# Patient Record
Sex: FEMALE | HISPANIC OR LATINO | RURAL
[De-identification: names, ages, dates, MRNs, and addresses within clinical notes are randomized per-mention and may not be internally consistent; named-entity substitution may affect disease eponyms.]

---

## 2018-09-11 ENCOUNTER — OFFICE VISIT (OUTPATIENT)
Dept: RETAIL CLINIC | Facility: CLINIC | Age: 38
End: 2018-09-11

## 2018-09-11 VITALS
BODY MASS INDEX: 30.62 KG/M2 | OXYGEN SATURATION: 98 % | DIASTOLIC BLOOD PRESSURE: 100 MMHG | HEART RATE: 87 BPM | RESPIRATION RATE: 12 BRPM | WEIGHT: 166.4 LBS | SYSTOLIC BLOOD PRESSURE: 160 MMHG | TEMPERATURE: 99.1 F | HEIGHT: 62 IN

## 2018-09-11 DIAGNOSIS — I10 ESSENTIAL HYPERTENSION: Primary | ICD-10-CM

## 2018-09-11 PROCEDURE — 99203 OFFICE O/P NEW LOW 30 MIN: CPT | Performed by: NURSE PRACTITIONER

## 2018-09-11 RX ORDER — LOSARTAN POTASSIUM 25 MG/1
25 TABLET ORAL DAILY
COMMUNITY
End: 2018-09-11 | Stop reason: SDUPTHER

## 2018-09-11 RX ORDER — LOSARTAN POTASSIUM 50 MG/1
50 TABLET ORAL DAILY
Qty: 90 TABLET | Refills: 0 | Status: SHIPPED | OUTPATIENT
Start: 2018-09-11 | End: 2018-10-20 | Stop reason: SDUPTHER

## 2018-09-11 NOTE — PATIENT INSTRUCTIONS
"Hipertensión  Hypertension  La hipertensión, conocida comúnmente [place] presión arterial florian, se produce cuando la jimy bombea en las arterias con mucha fuerza. Las arterias son los vasos sanguíneos que transportan la jimy desde el corazón al [place] del cuerpo. La hipertensión hace que el corazón melo más esfuerzo para bombear jimy y puede provocar que las arterias se estrechen o endurezcan. La hipertensión no tratada o no controlada puede causar infarto de miocardio, accidentes cerebrovasculares, enfermedad renal y otros problemas.  Elba lectura de la presión arterial consiste de un número más alto sobre un número más bajo. En condiciones ideales, la presión arterial debe estar por debajo de 120/80. El primer número (\"superior\") es la presión sistólica. Es la medida de la presión de las arterias cuando el corazón late. El max número (\"inferior\") es la presión diastólica. Es la medida de la presión en las arterias cuando el corazón se relaja.  ¿Cuáles son las causas?  Se desconoce la causa de esta afección.  ¿Qué incrementa el riesgo?  Algunos factores de riesgo de hipertensión están bajo moss control. Otros no.  Factores que puede modificar  · Fumar.  · Tener diabetes mellitus tipo 2, colesterol alto, o ambos.  · No hacer la cantidad suficiente de actividad física o ejercicio.  · Tener sobrepeso.  · Consumir mucha grasa, azúcar, calorías o sal (sodio) en moss dieta.  · Beber alcohol en exceso.  Factores que son difíciles o imposibles de modificar  · Tener enfermedad renal crónica.  · Tener antecedentes familiares de presión arterial florian.  · La edad. Los riesgos aumentan con la edad.  · La meka. El riesgo es mayor para las personas afroamericanas.  · El sexo. Antes de los 45 años, los hombres corren más riesgo que las mujeres. Después de los 65 años, las mujeres corren más riesgo que los hombres.  · Tener apnea obstructiva del sueño.  · El estrés.  ¿Cuáles son los signos o los síntomas?  La presión arterial " "extremadamente florian (crisis hipertensiva) puede provocar:  · Dolor de wm.  · Ansiedad.  · Falta de aire.  · Hemorragia nasal.  · Náuseas y vómitos.  · Dolor de pecho intenso.  · Maria Del Rosario crisis de movimientos que no puede controlar (convulsiones).    ¿Cómo se diagnostica?  Esta afección se diagnostica midiendo moss presión arterial mientras se encuentra sentado, con el brazo apoyado sobre maria del rosario superficie. El brazalete del tensiómetro debe colocarse directamente sobre la piel de la parte superior del brazo y al nivel de moss corazón. Debe medirla al menos dos veces en el mismo brazo. Determinadas condiciones pueden causar maria del rosario diferencia de presión arterial entre el brazo adelso y el derecho.  Ciertos factores pueden provocar que las lecturas de la presión arterial cristi inferiores o superiores a lo normal (elevadas) por un período corto de tiempo:  · Si moss presión arterial es más florian cuando se encuentra en el consultorio del médico que cuando la mide en moss hogar, se denomina \"hipertensión de bata yuriy\". La mayoría de las personas que tienen esta afección no deben ser medicadas.  · Si moss presión arterial es más florian en el hogar que cuando se encuentra en el consultorio del médico, se denomina \"hipertensión enmascarada\". La mayoría de las personas que tienen esta afección deben ser medicadas para controlar la presión arterial.    Si tiene maria del rosario lecturas de presión arterial florian mechelle maria del rosario visita o si tiene presión arterial normal con otros factores de riesgo:  · Es posible que se le pida que regrese otro día para volver a controlar moss presión arterial.  · Se le puede pedir que se controle la presión arterial en moss casa mechelle 1 semana o más.    Si se le diagnostica hipertensión, es posible que se le realicen otros análisis de jimy o estudios de diagnóstico por imágenes para ayudar a moss médico a comprender moss riesgo general de tener otras afecciones.  ¿Cómo se trata?  Esta afección se trata haciendo cambios saludables " en el estilo de rebecca, tales rakesh ingerir alimentos saludables, realizar más ejercicio y reducir el consumo de alcohol. El médico puede recetarle medicamentos si los cambios en el estilo de rebecca no son suficientes para lograr controlar la presión arterial y si:  · Moss presión arterial sistólica está por encima de 130.  · Moss presión arterial diastólica está por encima de 80.    La presión arterial deseada puede variar en función de las enfermedades, la edad y otros factores personales.  Siga estas instrucciones en moss casa:  Comida y bebida  · Siga maria del rosario dieta con alto contenido de fibras y potasio, y con bajo contenido de sodio, azúcar agregada y grasas. Un ejemplo de plan alimenticio es la dieta DASH (Dietary Approaches to Stop Hypertension, Métodos alimenticios para detener la hipertensión). Para alimentarse de esta manera:  ? Coma mucha fruta y verdura fresca. Trate de que la mitad del plato de cada comida sea de frutas y verduras.  ? Coma cereales integrales, rakehs pasta integral, arroz integral y pan integral. Llene aproximadamente un cuarto del plato con cereales integrales.  ? Coma y vaughn productos lácteos con bajo contenido de grasa, rakesh leche descremada o yogur bajo en grasas.  ? Evite la ingesta de bray de carne grasa, carne procesada o curada, y carne de ave con piel. Llene aproximadamente un cuarto del plato con proteínas magras, rakesh pescado, juvencio sin piel, frijoles, huevos y tofu.  ? Evite ingerir alimentos prehechos o procesados. En general, estos tienen mayor cantidad de sodio, azúcar agregada y grasa.  · Reduzca moss ingesta diaria de sodio. La mayoría de las personas que tienen hipertensión deben comer menos de 1500 mg de sodio por día.  · Limite el consumo de alcohol a no más de 1 medida por día si es isabell y no está embarazada y a 2 medidas por día si es hombre. Maria Del Rosario medida equivale a 12 onzas de cerveza, 5 onzas de vino o 1½ onzas de bebidas alcohólicas de florian graduación.  Estilo de rebecca  · Trabaje  con moss médico para mantener un peso saludable o perder peso. Pregúntele cual es moss peso recomendado.  · Realice al menos 30 minutos de ejercicio que melo que se acelere moss corazón (ejercicio aeróbico) la mayoría de los días de la semana. Estas actividades pueden incluir caminar, nadar o andar en bicicleta.  · Incluya ejercicios para [place] diana músculos (ejercicios de resistencia), rakesh pilates o levantamiento de pesas, rakesh parte de moss rutina semanal de ejercicios. Intente realizar 30 minutos de trang tipo de ejercicios al menos boni días a la semana.  · No consuma ningún producto que contenga nicotina o tabaco, ra[place]h cigarrillos y cigarrillos electrónicos. Si necesita ayuda para dejar de fumar, consulte al médico.  · Contrólese la presión arterial en moss casa según las indicaciones del médico.  · Concurra a todas las visitas de control rakesh se lo haya indicado el médico. [place] es importante.  Medicamentos  · [place] los medicamentos de venta yobany y los recetados solamente rakesh se lo haya indicado el médico. Siga cuidadosamente las indicaciones. Los medicamentos para la presión arterial deben tomarse según las indicaciones.  · No omita las dosis de medicamentos para la presión arterial. Si lo hace, estará en riesgo de tener problemas y puede hacer que los medicamentos cristi menos eficaces.  · Pregúntele a moss médico a qué efectos secundarios o reacciones a los medicamentos debe prestar atención.  Comuníquese con un médico si:  · Piensa que tiene maria del rosario reacción a un medicamento que está tomando.  · Tiene rupert de wm frecuentes (recurrentes).  · Siente mareos.  · Tiene hinchazón en los tobillos.  · Tiene problemas de visión.  Solicite ayuda de inmediato si:  · Siente un dolor de wm intenso o confusión.  · Siente debilidad inusual o adormecimiento.  · Siente que va a desmayarse.  · Siente un dolor intenso en el pecho o el abdomen.  · Vomita repetidas veces.  · Tiene dificultad para respirar.  Resumen  · La  hipertensión se produce cuando la jimy bombea en las arterias con mucha fuerza. Si esta afección no se controla, podría correr riesgo de tener complicaciones graves.  · La presión arterial deseada puede variar en función de las enfermedades, la edad y otros factores personales. Para la mayoría de las personas, maria del rosario presión arterial normal es clarice que 120/80.  · La hipertensión se trata con cambios en el estilo de rebecca, medicamentos o maria del rosario combinación de ambos. Los cambios en el estilo de rebecca incluyen pérdida de peso, ingerir alimentos sanos, seguir maria del rosario dieta baja en sodio, hacer más ejercicio y limitar el consumo de alcohol.  Esta información no tiene ra[place] fin reemplazar el consejo del médico. Asegúrese de hacerle al médico cualquier pregunta que tenga.  Document Released: 12/18/2006 Document Revised: 11/29/2017 Document Reviewed: 11/29/2017  Elsevier Interactive Patient Education © 2018 [employer]

## 2018-09-11 NOTE — PROGRESS NOTES
"Subjective   Lani Jeronimo is a 37 y.o. female.     Hypertension   This is a chronic problem. The current episode started more than 1 month ago. The problem is unchanged. The problem is uncontrolled. Associated symptoms include headaches. Pertinent negatives include no anxiety, blurred vision, chest pain, malaise/fatigue, neck pain, orthopnea, palpitations, peripheral edema, PND, shortness of breath or sweats. There are no associated agents to hypertension. Risk factors for coronary artery disease include family history. Past treatments include angiotensin blockers. Current antihypertension treatment includes angiotensin blockers. The current treatment provides significant improvement. There are no compliance problems.         The following portions of the patient's history were reviewed and updated as appropriate: allergies, current medications, past medical history, past social history, past surgical history and problem list.    Review of Systems   Constitutional: Negative.  Negative for appetite change, chills, fatigue, fever and malaise/fatigue.   Eyes: Negative for blurred vision.   Respiratory: Negative.  Negative for cough and shortness of breath.    Cardiovascular: Negative.  Negative for chest pain, palpitations, orthopnea and PND.   Gastrointestinal: Negative.  Negative for diarrhea, nausea and vomiting.   Musculoskeletal: Negative.  Negative for neck pain.   Skin: Negative.    Neurological: Positive for headaches. Negative for dizziness.   Hematological: Negative.  Negative for adenopathy.   Psychiatric/Behavioral: Negative.         /100   Pulse 87   Temp 99.1 °F (37.3 °C) (Oral)   Resp 12   Ht 157.5 cm (62\")   Wt 75.5 kg (166 lb 6.4 oz)   SpO2 98%   BMI 30.43 kg/m²      Objective   Physical Exam   Constitutional: She is oriented to person, place, and time. Vital signs are normal. She appears well-developed and well-nourished. No distress.   HENT:   Head: Normocephalic.   Right Ear: Tympanic " membrane, external ear and ear canal normal. No drainage, swelling or tenderness. Tympanic membrane is not erythematous and not bulging.   Left Ear: Tympanic membrane, external ear and ear canal normal. No drainage, swelling or tenderness. Tympanic membrane is not erythematous and not bulging.   Nose: No mucosal edema or rhinorrhea. Right sinus exhibits no maxillary sinus tenderness and no frontal sinus tenderness. Left sinus exhibits no maxillary sinus tenderness and no frontal sinus tenderness.   Mouth/Throat: Uvula is midline, oropharynx is clear and moist and mucous membranes are normal. Tonsils are 0 on the right. Tonsils are 0 on the left. No tonsillar exudate.   Neck: Normal range of motion. Neck supple.   Cardiovascular: Normal rate, regular rhythm, S1 normal, S2 normal and normal heart sounds.    Pulmonary/Chest: Effort normal and breath sounds normal. No respiratory distress. She has no wheezes. She has no rhonchi. She has no rales.   Abdominal: Soft. Bowel sounds are normal. She exhibits no distension. There is no tenderness. There is no rebound and no guarding.   Lymphadenopathy:        Head (right side): No tonsillar adenopathy present.        Head (left side): No tonsillar adenopathy present.     She has no cervical adenopathy.   Neurological: She is alert and oriented to person, place, and time.   Skin: Skin is warm and dry. No rash noted. She is not diaphoretic.   Psychiatric: She has a normal mood and affect. Her speech is normal and behavior is normal. Thought content normal.   Vitals reviewed.      Assessment/Plan   Lani was seen today for med refill.    Diagnoses and all orders for this visit:    Essential hypertension  -     losartan (COZAAR) 50 MG tablet; Take 1 tablet by mouth Daily for 90 days.

## 2018-10-20 ENCOUNTER — OFFICE VISIT (OUTPATIENT)
Dept: RETAIL CLINIC | Facility: CLINIC | Age: 38
End: 2018-10-20

## 2018-10-20 VITALS
BODY MASS INDEX: 30.66 KG/M2 | RESPIRATION RATE: 12 BRPM | WEIGHT: 166.6 LBS | OXYGEN SATURATION: 98 % | HEART RATE: 73 BPM | DIASTOLIC BLOOD PRESSURE: 86 MMHG | SYSTOLIC BLOOD PRESSURE: 142 MMHG | TEMPERATURE: 97.8 F | HEIGHT: 62 IN

## 2018-10-20 DIAGNOSIS — I10 ESSENTIAL HYPERTENSION: Primary | ICD-10-CM

## 2018-10-20 PROCEDURE — 99213 OFFICE O/P EST LOW 20 MIN: CPT | Performed by: NURSE PRACTITIONER

## 2018-10-20 RX ORDER — HYDROCHLOROTHIAZIDE 12.5 MG/1
12.5 TABLET ORAL DAILY
COMMUNITY
End: 2018-10-20 | Stop reason: SDUPTHER

## 2018-10-20 RX ORDER — HYDROCHLOROTHIAZIDE 12.5 MG/1
12.5 TABLET ORAL DAILY
Qty: 90 TABLET | Refills: 0 | Status: SHIPPED | OUTPATIENT
Start: 2018-10-20 | End: 2019-01-18

## 2018-10-20 RX ORDER — LOSARTAN POTASSIUM 50 MG/1
50 TABLET ORAL 2 TIMES DAILY
Qty: 180 TABLET | Refills: 0 | Status: SHIPPED | OUTPATIENT
Start: 2018-10-20 | End: 2019-01-18

## 2018-10-20 NOTE — PROGRESS NOTES
"Subjective   Lani Jeronimo is a 37 y.o. female.     Patient presents to clinic for refill of hypertension medication while visiting from [place].  Denies any significant physical complaints other than elevated blood pressure.  States that she has been out of her medication for a few days.          The following portions of the patient's history were reviewed and updated as appropriate: allergies, current medications, past medical history, past social history, past surgical history and problem list.    Review of Systems   Constitutional: Negative.  Negative for appetite change, chills, fatigue and fever.   HENT: Negative.  Negative for congestion, postnasal drip and rhinorrhea.    Respiratory: Negative.  Negative for cough, shortness of breath and stridor.    Cardiovascular: Negative.    Gastrointestinal: Negative.  Negative for diarrhea, nausea and vomiting.   Musculoskeletal: Negative.    Skin: Negative.    Neurological: Negative.  Negative for dizziness and headaches.   Hematological: Negative.    Psychiatric/Behavioral: Negative.         /86   Pulse 73   Temp 97.8 °F (36.6 °C) (Oral)   Resp 12   Ht 157.5 cm (62\")   Wt 75.6 kg (166 lb 9.6 oz)   SpO2 98%   BMI 30.47 kg/m²      Objective   Physical Exam   Constitutional: She is oriented to person, place, and time. She appears well-developed and well-nourished. No distress.   HENT:   Head: Normocephalic.   Eyes: Pupils are equal, round, and reactive to light. Conjunctivae are normal.   Neck: Normal range of motion. Neck supple.   Cardiovascular: Normal rate, regular rhythm, S1 normal, S2 normal and normal heart sounds.    Pulmonary/Chest: Effort normal and breath sounds normal. No respiratory distress. She has no wheezes.   Abdominal: Soft. Bowel sounds are normal. She exhibits no distension. There is no tenderness. There is no rebound and no guarding.   Lymphadenopathy:        Head (right side): No tonsillar, no preauricular and no posterior auricular " adenopathy present.        Head (left side): No tonsillar, no preauricular and no posterior auricular adenopathy present.     She has no cervical adenopathy.   Neurological: She is alert and oriented to person, place, and time.   Skin: Skin is warm, dry and intact.   Psychiatric: She has a normal mood and affect. Her speech is normal and behavior is normal. Thought content normal.   Vitals reviewed.      Assessment/Plan   Lani was seen today for med refill.    Diagnoses and all orders for this visit:    Essential hypertension  -     losartan (COZAAR) 50 MG tablet; Take 1 tablet by mouth 2 (Two) Times a Day for 90 days.  -     hydrochlorothiazide (HYDRODIURIL) 12.5 MG tablet; Take 1 tablet by mouth Daily for 90 days.